# Patient Record
Sex: FEMALE | Race: BLACK OR AFRICAN AMERICAN | Employment: FULL TIME | URBAN - METROPOLITAN AREA
[De-identification: names, ages, dates, MRNs, and addresses within clinical notes are randomized per-mention and may not be internally consistent; named-entity substitution may affect disease eponyms.]

---

## 2020-06-25 ENCOUNTER — APPOINTMENT (OUTPATIENT)
Dept: GENERAL RADIOLOGY | Age: 60
End: 2020-06-25
Payer: COMMERCIAL

## 2020-06-25 ENCOUNTER — HOSPITAL ENCOUNTER (EMERGENCY)
Age: 60
Discharge: HOME | End: 2020-06-25
Payer: COMMERCIAL

## 2020-06-25 VITALS
SYSTOLIC BLOOD PRESSURE: 152 MMHG | HEIGHT: 66 IN | DIASTOLIC BLOOD PRESSURE: 89 MMHG | RESPIRATION RATE: 18 BRPM | OXYGEN SATURATION: 99 % | TEMPERATURE: 97.8 F | BODY MASS INDEX: 45 KG/M2 | WEIGHT: 280 LBS | HEART RATE: 77 BPM

## 2020-06-25 PROCEDURE — 6370000000 HC RX 637 (ALT 250 FOR IP): Performed by: PHYSICIAN ASSISTANT

## 2020-06-25 PROCEDURE — 73564 X-RAY EXAM KNEE 4 OR MORE: CPT

## 2020-06-25 PROCEDURE — 73502 X-RAY EXAM HIP UNI 2-3 VIEWS: CPT

## 2020-06-25 PROCEDURE — 99283 EMERGENCY DEPT VISIT LOW MDM: CPT

## 2020-06-25 RX ORDER — LIDOCAINE 4 G/G
1 PATCH TOPICAL ONCE
Status: DISCONTINUED | OUTPATIENT
Start: 2020-06-25 | End: 2020-06-25 | Stop reason: HOSPADM

## 2020-06-25 RX ORDER — CELECOXIB 200 MG/1
200 CAPSULE ORAL 2 TIMES DAILY
COMMUNITY
Start: 2020-05-29

## 2020-06-25 RX ORDER — OXYCODONE HYDROCHLORIDE AND ACETAMINOPHEN 5; 325 MG/1; MG/1
2 TABLET ORAL ONCE
Status: COMPLETED | OUTPATIENT
Start: 2020-06-25 | End: 2020-06-25

## 2020-06-25 RX ORDER — HYDROCHLOROTHIAZIDE 12.5 MG/1
1 TABLET ORAL DAILY
COMMUNITY
Start: 2018-05-25

## 2020-06-25 RX ORDER — ALBUTEROL SULFATE 90 UG/1
AEROSOL, METERED RESPIRATORY (INHALATION)
COMMUNITY
Start: 2020-03-19

## 2020-06-25 RX ORDER — LIDOCAINE 50 MG/G
1 PATCH TOPICAL DAILY
Qty: 30 PATCH | Refills: 0 | Status: SHIPPED | OUTPATIENT
Start: 2020-06-25 | End: 2022-08-25 | Stop reason: ALTCHOICE

## 2020-06-25 RX ORDER — AMLODIPINE BESYLATE 10 MG/1
10 TABLET ORAL DAILY
COMMUNITY

## 2020-06-25 RX ORDER — LOSARTAN POTASSIUM AND HYDROCHLOROTHIAZIDE 12.5; 5 MG/1; MG/1
TABLET ORAL
COMMUNITY
Start: 2007-05-23

## 2020-06-25 RX ORDER — ATORVASTATIN CALCIUM 40 MG/1
TABLET, FILM COATED ORAL
COMMUNITY

## 2020-06-25 RX ADMIN — OXYCODONE HYDROCHLORIDE AND ACETAMINOPHEN 2 TABLET: 5; 325 TABLET ORAL at 09:56

## 2020-06-25 SDOH — HEALTH STABILITY: MENTAL HEALTH: HOW OFTEN DO YOU HAVE A DRINK CONTAINING ALCOHOL?: NEVER

## 2020-06-25 ASSESSMENT — PAIN DESCRIPTION - ORIENTATION: ORIENTATION: LEFT

## 2020-06-25 ASSESSMENT — PAIN SCALES - GENERAL
PAINLEVEL_OUTOF10: 8
PAINLEVEL_OUTOF10: 8

## 2020-06-25 ASSESSMENT — PAIN DESCRIPTION - LOCATION: LOCATION: BACK;HIP;LEG;HEAD

## 2020-06-25 ASSESSMENT — PAIN DESCRIPTION - PAIN TYPE: TYPE: ACUTE PAIN

## 2020-06-25 NOTE — LETTER
[facility] Emergency Department  [address] [facility] [place], [address]             June 25, 2020    Patient: Tello Leger   YOB: 1960   Date of Visit: 6/25/2020       To Whom It May Concern:    Shai Suarez was seen and treated in our emergency department on 6/25/2020. She may return to work on date specified by [facility] - pt will follow up with them for further instructions and work restrictions. Sincerely,         MERRY SWEET.  [facility]

## 2020-06-25 NOTE — ED PROVIDER NOTES
rhonchi or rales. Abdominal:      General: Bowel sounds are normal. There is no distension. Palpations: Abdomen is soft. There is no mass. Tenderness: There is no abdominal tenderness. There is no guarding or rebound. Hernia: No hernia is present. Musculoskeletal: Normal range of motion. General: No swelling. Right lower leg: No edema. Left lower leg: No edema. Comments: Some generalized tenderness over the left hip and left knee. No obvious deformity. No midline tenderness or step-off in the neck or back. Mild decreased range of motion with flexion of the left hip and knee secondary to pain. Full range of motion of left ankle. Gross full range of motion. remainder 3 extremities. No obvious sign of deformity. Skin:     General: Skin is warm and dry. Findings: No erythema or rash. Neurological:      Mental Status: She is alert and oriented to person, place, and time. Cranial Nerves: No cranial nerve deficit. Psychiatric:         Behavior: Behavior normal.         DIAGNOSTIC RESULTS   LABS:    Labs Reviewed - No data to display    All other labs were within normal range or not returned as of this dictation. EKG: All EKG's are interpreted by the Emergency Department Physician in the absence of a cardiologist.  Please see their note for interpretation of EKG. RADIOLOGY:   Non-plain film images such as CT, Ultrasound and MRI are read by the radiologist. Plain radiographic images are visualized and preliminarily interpreted by the ED Provider with the below findings:        Interpretation per the Radiologist below, if available at the time of this note:    XR KNEE LEFT (MIN 4 VIEWS)   Preliminary Result   1. No acute radiographic abnormality of the left hip or osseous pelvis. There is mild symmetric bilateral hip osteoarthritis. 2. Small suprapatellar joint effusion at the left knee, without acute osseous   abnormality.   Moderate tricompartmental

## 2022-08-25 ENCOUNTER — APPOINTMENT (OUTPATIENT)
Dept: CT IMAGING | Age: 62
End: 2022-08-25
Payer: COMMERCIAL

## 2022-08-25 ENCOUNTER — HOSPITAL ENCOUNTER (EMERGENCY)
Age: 62
Discharge: HOME | End: 2022-08-25
Attending: EMERGENCY MEDICINE
Payer: COMMERCIAL

## 2022-08-25 VITALS
BODY MASS INDEX: 43.79 KG/M2 | DIASTOLIC BLOOD PRESSURE: 91 MMHG | WEIGHT: 279 LBS | HEIGHT: 67 IN | RESPIRATION RATE: 14 BRPM | HEART RATE: 67 BPM | TEMPERATURE: 97.9 F | OXYGEN SATURATION: 100 % | SYSTOLIC BLOOD PRESSURE: 168 MMHG

## 2022-08-25 DIAGNOSIS — M54.50 CHRONIC BILATERAL LOW BACK PAIN WITHOUT SCIATICA: Primary | ICD-10-CM

## 2022-08-25 DIAGNOSIS — I10 PRIMARY HYPERTENSION: ICD-10-CM

## 2022-08-25 DIAGNOSIS — G89.29 CHRONIC BILATERAL LOW BACK PAIN WITHOUT SCIATICA: Primary | ICD-10-CM

## 2022-08-25 LAB
BACTERIA: ABNORMAL /HPF
BILIRUBIN URINE: NEGATIVE
BLOOD, URINE: NEGATIVE
CLARITY: CLEAR
COLOR: YELLOW
EPITHELIAL CELLS, UA: ABNORMAL /HPF (ref 0–5)
GLUCOSE URINE: NEGATIVE MG/DL
KETONES, URINE: NEGATIVE MG/DL
LEUKOCYTE ESTERASE, URINE: ABNORMAL
MICROSCOPIC EXAMINATION: YES
NITRITE, URINE: NEGATIVE
PH UA: 7 (ref 5–8)
PROTEIN UA: NEGATIVE MG/DL
RBC UA: ABNORMAL /HPF (ref 0–4)
SPECIFIC GRAVITY UA: 1.01 (ref 1–1.03)
URINE TYPE: ABNORMAL
UROBILINOGEN, URINE: 0.2 E.U./DL
WBC UA: ABNORMAL /HPF (ref 0–5)

## 2022-08-25 PROCEDURE — 81001 URINALYSIS AUTO W/SCOPE: CPT

## 2022-08-25 PROCEDURE — 70450 CT HEAD/BRAIN W/O DYE: CPT

## 2022-08-25 PROCEDURE — 96372 THER/PROPH/DIAG INJ SC/IM: CPT

## 2022-08-25 PROCEDURE — 99284 EMERGENCY DEPT VISIT MOD MDM: CPT

## 2022-08-25 PROCEDURE — 6370000000 HC RX 637 (ALT 250 FOR IP): Performed by: EMERGENCY MEDICINE

## 2022-08-25 PROCEDURE — 6360000002 HC RX W HCPCS: Performed by: EMERGENCY MEDICINE

## 2022-08-25 RX ORDER — LIDOCAINE 50 MG/G
1 PATCH TOPICAL DAILY
Qty: 30 PATCH | Refills: 0 | Status: SHIPPED | OUTPATIENT
Start: 2022-08-25

## 2022-08-25 RX ORDER — CYCLOBENZAPRINE HCL 10 MG
10 TABLET ORAL 3 TIMES DAILY PRN
Qty: 15 TABLET | Refills: 0 | Status: SHIPPED | OUTPATIENT
Start: 2022-08-25

## 2022-08-25 RX ORDER — OXYCODONE AND ACETAMINOPHEN 10; 325 MG/1; MG/1
1 TABLET ORAL 4 TIMES DAILY
COMMUNITY
Start: 2022-07-31

## 2022-08-25 RX ORDER — LIDOCAINE 4 G/G
1 PATCH TOPICAL ONCE
Status: DISCONTINUED | OUTPATIENT
Start: 2022-08-25 | End: 2022-08-25 | Stop reason: HOSPADM

## 2022-08-25 RX ORDER — KETOROLAC TROMETHAMINE 30 MG/ML
15 INJECTION, SOLUTION INTRAMUSCULAR; INTRAVENOUS ONCE
Status: COMPLETED | OUTPATIENT
Start: 2022-08-25 | End: 2022-08-25

## 2022-08-25 RX ORDER — CYCLOBENZAPRINE HCL 10 MG
10 TABLET ORAL ONCE
Status: COMPLETED | OUTPATIENT
Start: 2022-08-25 | End: 2022-08-25

## 2022-08-25 RX ADMIN — CYCLOBENZAPRINE 10 MG: 10 TABLET, FILM COATED ORAL at 04:14

## 2022-08-25 RX ADMIN — KETOROLAC TROMETHAMINE 15 MG: 30 INJECTION, SOLUTION INTRAMUSCULAR at 04:14

## 2022-08-25 ASSESSMENT — PAIN - FUNCTIONAL ASSESSMENT
PAIN_FUNCTIONAL_ASSESSMENT: 0-10
PAIN_FUNCTIONAL_ASSESSMENT: ACTIVITIES ARE NOT PREVENTED
PAIN_FUNCTIONAL_ASSESSMENT: ACTIVITIES ARE NOT PREVENTED

## 2022-08-25 ASSESSMENT — PAIN DESCRIPTION - LOCATION
LOCATION: BACK;HEAD
LOCATION: BACK;HEAD

## 2022-08-25 ASSESSMENT — PAIN DESCRIPTION - DESCRIPTORS
DESCRIPTORS: PRESSURE;DISCOMFORT
DESCRIPTORS: ACHING;DISCOMFORT

## 2022-08-25 ASSESSMENT — PAIN DESCRIPTION - ONSET: ONSET: ON-GOING

## 2022-08-25 ASSESSMENT — PAIN DESCRIPTION - PAIN TYPE: TYPE: ACUTE PAIN

## 2022-08-25 ASSESSMENT — PAIN SCALES - GENERAL
PAINLEVEL_OUTOF10: 9
PAINLEVEL_OUTOF10: 9

## 2022-08-25 ASSESSMENT — ENCOUNTER SYMPTOMS
BACK PAIN: 1
EYES NEGATIVE: 1
RESPIRATORY NEGATIVE: 1
GASTROINTESTINAL NEGATIVE: 1

## 2022-08-25 ASSESSMENT — PAIN DESCRIPTION - FREQUENCY: FREQUENCY: CONTINUOUS

## 2022-08-25 ASSESSMENT — PAIN DESCRIPTION - ORIENTATION: ORIENTATION: MID

## 2022-08-25 NOTE — ED PROVIDER NOTES
[address] [facility] ENCOUNTER          ATTENDING PHYSICIAN NOTE       Date of evaluation: 8/25/2022    Chief Complaint     Hypertension, Headache, and Neck Pain      History of Present Illness     Jesus Frank is a 63 y.o. female who presents to the emergency department complaining of neck, back, knee pain and high blood pressure and headache. Patient has a history of chronic neck and back pain for which she follows with pain management specialist.  She states over the last several days the pain has been more severe than usual and not well controlled on her normal medications. She is also noted over the last day that her blood pressure has been elevated. She states has been as high as 919 systolic at home. She does note mild headache associate with this. She denies any fevers or chills. She denies any nausea or vomiting. She denies any chest pain or shortness of breath. Patient is anticoagulated but has no history of trauma. She denies any recent changes in her blood pressure medications or her pain management regimen. She states she was unable to sleep due to the pain which is what prompted her to come to the emergency department this morning. Review of Systems     Review of Systems   Constitutional: Negative. HENT: Negative. Eyes: Negative. Respiratory: Negative. Cardiovascular: Negative. Gastrointestinal: Negative. Genitourinary: Negative. Musculoskeletal:  Positive for arthralgias, back pain and myalgias. Neurological:  Positive for headaches. All other systems reviewed and are negative. Past Medical, Surgical, Family, and Social History     She has a past medical history of Cancer ([address] Hyperlipidemia, and Hypertension. She has no past surgical history on file. Her family history is not on file. She reports that she has never smoked.  She has never used smokeless tobacco. She reports that she does not drink alcohol and does not use drugs.    Medications     Previous Medications    ALBUTEROL SULFATE  (90 BASE) MCG/ACT INHALER    INHALE 2 PUFFS BY INHALATION ROUTE EVERY 4 TO 6 HOURS PRN FOR COUGH    AMLODIPINE (NORVASC) 10 MG TABLET    Take 10 mg by mouth daily    ATORVASTATIN (LIPITOR) 40 MG TABLET    Take by mouth    CELECOXIB (CELEBREX) 200 MG CAPSULE    Take 200 mg by mouth 2 times daily    HYDROCHLOROTHIAZIDE (HYDRODIURIL) 12.5 MG TABLET    Take 1 tablet by mouth daily    LOSARTAN-HYDROCHLOROTHIAZIDE (HYZAAR) 50-12.5 MG PER TABLET    Take by mouth    OXYCODONE-ACETAMINOPHEN (PERCOCET)  MG PER TABLET    Take 1 tablet by mouth 4 times daily. Allergies     She is allergic to amitriptyline and amlodipine. Physical Exam     INITIAL VITALS: BP: (!) 174/108, Temp: 97.9 °F (36.6 °C), Heart Rate: 69, Resp: 18, SpO2: 100 %   Physical Exam  Vitals and nursing note reviewed. Constitutional:       General: She is not in acute distress. HENT:      Head: Normocephalic and atraumatic. Mouth/Throat:      Mouth: Mucous membranes are moist.      Pharynx: No oropharyngeal exudate. Eyes:      General: No scleral icterus. Extraocular Movements: Extraocular movements intact. Conjunctiva/sclera: Conjunctivae normal.      Pupils: Pupils are equal, round, and reactive to light. Cardiovascular:      Rate and Rhythm: Normal rate and regular rhythm. Heart sounds: Normal heart sounds. Pulmonary:      Effort: Pulmonary effort is normal.      Breath sounds: Normal breath sounds. No wheezing, rhonchi or rales. Abdominal:      General: Bowel sounds are normal.      Palpations: Abdomen is soft. Tenderness: There is no abdominal tenderness. There is no guarding or rebound. Musculoskeletal:         General: Tenderness present. No swelling. Normal range of motion. Cervical back: Normal range of motion and neck supple. Thoracic back: Tenderness present. No bony tenderness.       Comments: Patient does have bandlike tenderness across the lower lumbar spine with no midline tenderness or step-offs noted. She does complain of pain to bilateral knees but states that this is chronic. There is no overlying erythema or warmth noted. Skin:     General: Skin is warm and dry. Neurological:      General: No focal deficit present. Mental Status: She is alert and oriented to person, place, and time. Cranial Nerves: No cranial nerve deficit. Motor: No weakness. Coordination: Coordination normal.       Diagnostic Results     RADIOLOGY:  CT HEAD WO CONTRAST   Final Result      No acute intracranial abnormality. LABS:   Results for orders placed or performed during the hospital encounter of 08/25/22   Urinalysis   Result Value Ref Range    Color, UA Yellow Straw/Yellow    Clarity, UA Clear Clear    Glucose, Ur Negative Negative mg/dL    Bilirubin Urine Negative Negative    Ketones, Urine Negative Negative mg/dL    Specific Gravity, UA 1.015 1.005 - 1.030    Blood, Urine Negative Negative    pH, UA 7.0 5.0 - 8.0    Protein, UA Negative Negative mg/dL    Urobilinogen, Urine 0.2 <2.0 E.U./dL    Nitrite, Urine Negative Negative    Leukocyte Esterase, Urine MODERATE (A) Negative    Microscopic Examination YES     Urine Type NotGiven    Microscopic Urinalysis   Result Value Ref Range    WBC, UA 3-5 0 - 5 /HPF    RBC, UA 0-2 0 - 4 /HPF    Epithelial Cells, UA 6-10 (A) 0 - 5 /HPF    Bacteria, UA 1+ (A) None Seen /HPF       ED BEDSIDE ULTRASOUND:  No results found. RECENT VITALS:  BP: (!) 168/91,Temp: 97.9 °F (36.6 °C), Heart Rate: 67, Resp: 14, SpO2: 100 %     Procedures     N/A    ED Course     Nursing Notes, Past Medical Hx, Past Surgical Hx, Social Hx,Allergies, and Family Hx were reviewed.          patient was given the following medications:  Orders Placed This Encounter   Medications    ketorolac (TORADOL) injection 15 mg    cyclobenzaprine (FLEXERIL) tablet 10 mg    lidocaine 4 % external patch 1 patch    lidocaine (LIDODERM) 5 %     Sig: Place 1 patch onto the skin daily 12 hours on, 12 hours off. Dispense:  30 patch     Refill:  0       CONSULTS:  None    MEDICAL DECISIONMAKING / ASSESSMENT / Helen Bosworth is a 63 y.o. female with history of hypertension chronic back pain presents complaining of both. Patient states that she has been having worsening back pain over the last several days and is noted that her blood pressure has been elevated. She also complains of headache that she states is typical for her blood pressure being elevated, but the back pain is not typical for her blood pressure. On arrival, patient is hypertensive in the 170s over 110s. She has no focal neurologic deficits on exam.  Urinalysis shows no evidence of infection. CT scan of the head shows no acute intracranial abnormalities. Patient was given Toradol, Flexeril, and Lidoderm patch with improvement of her pain. With her pain improving, blood pressure did start to trend down. I feel most likely patient's blood pressure elevation is due to her discomfort. I did recommend that she contact her pain management specialist for titration of medications and see if this improves her blood pressure. She will otherwise be instructed to follow-up with her primary care provider for recheck of her blood pressure and further titration of her antihypertensives. Clinical Impression     1. Chronic bilateral low back pain without sciatica    2. Primary hypertension        Disposition     PATIENT REFERRED TO:  Mariaelena Pedraza  [address] [address] [phone]          Bala Fraser (Will) [address] [place] [id number]  [address]  [phone]          DISCHARGE MEDICATIONS:  New Prescriptions    LIDOCAINE (LIDODERM) 5 %    Place 1 patch onto the skin daily 12 hours on, 12 hours off.        DISPOSITION Decision To Discharge 08/25/2022 06:13:32 AM         Oliver Huang MD  08/25/22 0745

## 2022-08-25 NOTE — LETTER
[facility] Emergency Department  [facility] [address]  Phone: [phone]  Fax: [phone]               August 25, 2022    Patient: Robin Ramirez   YOB: 1960   Date of Visit: 8/25/2022       To Whom It May Concern:    Nicole Trent was seen and treated in our emergency department on 8/25/2022. She may return to work on 8/26/22.       Sincerely,       Zach Bergeron MD         Signature:__________________________________

## 2022-08-25 NOTE — ED NOTES
Pt discharged from ED in stable condition. Discharge instruction explain, all question answered. Prescription given. Pt walked to [facility] independently.        Michelet Simon RN  08/25/22 [id number]

## 2022-08-25 NOTE — DISCHARGE INSTRUCTIONS
Elevated blood pressure is likely due to discomfort from worsening back pain. Continue your current medications. Use Lidoderm patches 12 hours on at a time and 12 hours off. Use heat over sore areas. Follow-up with your pain management specialist and primary care provider for repeat evaluation and further symptom management.

## 2022-08-25 NOTE — ED TRIAGE NOTES
Patient arrived in the ER with c/o high b/p, neck pain, headache and back pain. Patient states that her b/p has been going up and down all day. Patient has a hx of hypertension.

## 2023-08-31 NOTE — ED NOTES
Nursing Discharge Notes:    -Patient discharged at this time in no acute distress after verbalizing understanding of discharge instructions and need for follow up with PCP and/or specialist if one was referred.  -A copy of the AVS was reviewed with pt and/or family.  -Pt received applicable scripts which were reviewed with pt and/or family by this RN. -Pt was given the opportunity to ask questions before signing for discharge. Pt aware to follow up with [facility]. Patient Mobility at Discharge:    -Pt left ambulatory to lobby / discharge area. -Wheelchair offered and was declined. Patient Education:    Learner - Patient and/or family / caregiver. Motivation and Readiness To Learn - Medium to High  Barriers To Learning - None  Learning Preference / Provided Instructions - Both written and verbal discharge instructions.      Louie Doan RN  06/25/20 2226 Type Of Destruction Used: Curettage

## 2024-06-26 ENCOUNTER — HOSPITAL ENCOUNTER (EMERGENCY)
Age: 64
Discharge: HOME | End: 2024-06-27
Attending: EMERGENCY MEDICINE
Payer: COMMERCIAL

## 2024-06-26 ENCOUNTER — APPOINTMENT (OUTPATIENT)
Dept: GENERAL RADIOLOGY | Age: 64
End: 2024-06-26
Payer: COMMERCIAL

## 2024-06-26 DIAGNOSIS — R42 LIGHTHEADEDNESS: Primary | ICD-10-CM

## 2024-06-26 LAB
ANION GAP SERPL CALCULATED.3IONS-SCNC: 10 MMOL/L (ref 3–16)
ANTI-XA UNFRAC HEPARIN: <0.1 IU/ML (ref 0.3–0.7)
BASOPHILS # BLD: 0 K/UL (ref 0–0.2)
BASOPHILS NFR BLD: 0.2 %
BUN SERPL-MCNC: 16 MG/DL (ref 7–20)
CALCIUM SERPL-MCNC: 9.1 MG/DL (ref 8.3–10.6)
CHLORIDE SERPL-SCNC: 102 MMOL/L (ref 99–110)
CO2 SERPL-SCNC: 26 MMOL/L (ref 21–32)
CREAT SERPL-MCNC: 0.8 MG/DL (ref 0.6–1.2)
DEPRECATED RDW RBC AUTO: 15.2 % (ref 12.4–15.4)
EOSINOPHIL # BLD: 0.1 K/UL (ref 0–0.6)
EOSINOPHIL NFR BLD: 0.8 %
GFR SERPLBLD CREATININE-BSD FMLA CKD-EPI: 84 ML/MIN/{1.73_M2}
GLUCOSE SERPL-MCNC: 131 MG/DL (ref 70–99)
HCT VFR BLD AUTO: 38.8 % (ref 36–48)
HGB BLD-MCNC: 12.5 G/DL (ref 12–16)
LYMPHOCYTES # BLD: 1.1 K/UL (ref 1–5.1)
LYMPHOCYTES NFR BLD: 15.1 %
MAGNESIUM SERPL-MCNC: 1.9 MG/DL (ref 1.8–2.4)
MCH RBC QN AUTO: 29.6 PG (ref 26–34)
MCHC RBC AUTO-ENTMCNC: 32.1 G/DL (ref 31–36)
MCV RBC AUTO: 92 FL (ref 80–100)
MONOCYTES # BLD: 0.5 K/UL (ref 0–1.3)
MONOCYTES NFR BLD: 6.4 %
NEUTROPHILS # BLD: 5.5 K/UL (ref 1.7–7.7)
NEUTROPHILS NFR BLD: 77.5 %
NT-PROBNP SERPL-MCNC: 110 PG/ML (ref 0–124)
PLATELET # BLD AUTO: 147 K/UL (ref 135–450)
PMV BLD AUTO: 7.5 FL (ref 5–10.5)
POTASSIUM SERPL-SCNC: 4.6 MMOL/L (ref 3.5–5.1)
RBC # BLD AUTO: 4.22 M/UL (ref 4–5.2)
SODIUM SERPL-SCNC: 138 MMOL/L (ref 136–145)
TROPONIN, HIGH SENSITIVITY: <6 NG/L (ref 0–14)
WBC # BLD AUTO: 7.1 K/UL (ref 4–11)

## 2024-06-26 PROCEDURE — 85025 COMPLETE CBC W/AUTO DIFF WBC: CPT

## 2024-06-26 PROCEDURE — 83880 ASSAY OF NATRIURETIC PEPTIDE: CPT

## 2024-06-26 PROCEDURE — 71045 X-RAY EXAM CHEST 1 VIEW: CPT

## 2024-06-26 PROCEDURE — 6370000000 HC RX 637 (ALT 250 FOR IP)

## 2024-06-26 PROCEDURE — 83735 ASSAY OF MAGNESIUM: CPT

## 2024-06-26 PROCEDURE — 99285 EMERGENCY DEPT VISIT HI MDM: CPT

## 2024-06-26 PROCEDURE — 93005 ELECTROCARDIOGRAM TRACING: CPT | Performed by: EMERGENCY MEDICINE

## 2024-06-26 PROCEDURE — 80048 BASIC METABOLIC PNL TOTAL CA: CPT

## 2024-06-26 PROCEDURE — 84484 ASSAY OF TROPONIN QUANT: CPT

## 2024-06-26 PROCEDURE — 85520 HEPARIN ASSAY: CPT

## 2024-06-26 RX ORDER — METHOCARBAMOL 500 MG/1
1000 TABLET, FILM COATED ORAL ONCE
Status: COMPLETED | OUTPATIENT
Start: 2024-06-26 | End: 2024-06-26

## 2024-06-26 RX ORDER — ACETAMINOPHEN 500 MG
1000 TABLET ORAL ONCE
Status: COMPLETED | OUTPATIENT
Start: 2024-06-26 | End: 2024-06-26

## 2024-06-26 RX ORDER — OXYCODONE HYDROCHLORIDE 5 MG/1
10 TABLET ORAL ONCE
Status: COMPLETED | OUTPATIENT
Start: 2024-06-26 | End: 2024-06-26

## 2024-06-26 RX ADMIN — ACETAMINOPHEN 1000 MG: 500 TABLET ORAL at 23:34

## 2024-06-26 RX ADMIN — METHOCARBAMOL 1000 MG: 500 TABLET ORAL at 23:34

## 2024-06-26 RX ADMIN — OXYCODONE HYDROCHLORIDE 10 MG: 5 TABLET ORAL at 23:34

## 2024-06-26 ASSESSMENT — PAIN DESCRIPTION - ORIENTATION
ORIENTATION: MID
ORIENTATION: LOWER

## 2024-06-26 ASSESSMENT — PAIN DESCRIPTION - FREQUENCY: FREQUENCY: INTERMITTENT

## 2024-06-26 ASSESSMENT — PAIN SCALES - GENERAL
PAINLEVEL_OUTOF10: 10
PAINLEVEL_OUTOF10: 8

## 2024-06-26 ASSESSMENT — PAIN DESCRIPTION - DESCRIPTORS: DESCRIPTORS: DISCOMFORT

## 2024-06-26 ASSESSMENT — PAIN DESCRIPTION - LOCATION
LOCATION: BACK
LOCATION: BACK

## 2024-06-26 ASSESSMENT — LIFESTYLE VARIABLES
HOW MANY STANDARD DRINKS CONTAINING ALCOHOL DO YOU HAVE ON A TYPICAL DAY: 1 OR 2
HOW OFTEN DO YOU HAVE A DRINK CONTAINING ALCOHOL: MONTHLY OR LESS

## 2024-06-26 ASSESSMENT — PAIN - FUNCTIONAL ASSESSMENT
PAIN_FUNCTIONAL_ASSESSMENT: 0-10
PAIN_FUNCTIONAL_ASSESSMENT: PREVENTS OR INTERFERES SOME ACTIVE ACTIVITIES AND ADLS

## 2024-06-26 ASSESSMENT — PAIN DESCRIPTION - PAIN TYPE: TYPE: CHRONIC PAIN

## 2024-06-26 ASSESSMENT — PAIN DESCRIPTION - ONSET: ONSET: ON-GOING

## 2024-06-27 ENCOUNTER — APPOINTMENT (OUTPATIENT)
Dept: CT IMAGING | Age: 64
End: 2024-06-27
Payer: COMMERCIAL

## 2024-06-27 VITALS
SYSTOLIC BLOOD PRESSURE: 126 MMHG | OXYGEN SATURATION: 99 % | TEMPERATURE: 98.3 F | RESPIRATION RATE: 12 BRPM | HEIGHT: 66 IN | WEIGHT: 275 LBS | DIASTOLIC BLOOD PRESSURE: 78 MMHG | HEART RATE: 67 BPM | BODY MASS INDEX: 44.2 KG/M2

## 2024-06-27 LAB
EKG ATRIAL RATE: 63 BPM
EKG DIAGNOSIS: NORMAL
EKG P AXIS: 58 DEGREES
EKG P-R INTERVAL: 176 MS
EKG Q-T INTERVAL: 422 MS
EKG QRS DURATION: 88 MS
EKG QTC CALCULATION (BAZETT): 431 MS
EKG R AXIS: 32 DEGREES
EKG T AXIS: -4 DEGREES
EKG VENTRICULAR RATE: 63 BPM

## 2024-06-27 PROCEDURE — 6360000004 HC RX CONTRAST MEDICATION

## 2024-06-27 PROCEDURE — 71260 CT THORAX DX C+: CPT

## 2024-06-27 RX ADMIN — IOPAMIDOL 75 ML: 755 INJECTION, SOLUTION INTRAVENOUS at 01:05

## 2024-06-27 ASSESSMENT — PAIN SCALES - GENERAL: PAINLEVEL_OUTOF10: 6

## 2024-06-27 NOTE — ED NOTES
Pt sitting up in bed eyes open, AAOx4, NAD, resp e/u on RA, bed locked lowest position, rails up x2, call light within reach, family at bedside, pt on BP cuff Pulse ox and Cardiac monitor, pt states has been having Mid back pain and neck pain the past 5 days which she states typically she deals with low back pain from a previous injury and sees pain management for, but that this pain has been worsening, and today while driving home from [employer] study started feeling dizzy which resolved by the time she arrived to the ED, requesting pain medication, will request from provider. Pt has 20g IV noted to R AC but is infiltrated when going to flush it, removed by this RN.     Filerman, Brandon, RN  06/26/24 1900

## 2024-06-27 NOTE — ED PROVIDER NOTES
ED Attending Attestation Note     Date of evaluation: 6/26/2024    This patient was seen by the resident.  I have seen and examined the patient, agree with the workup, evaluation, management and diagnosis. The care plan has been discussed.  I have reviewed the ECG and concur with the resident's interpretation.  My assessment reveals patient is awake and alert, no acute distress.  No increased work of breathing.  No asymmetric lower extremity swelling.  Face symmetric, speech clear.  Strong regular radial pulse.     Brendan Diallo MD  06/27/24 0223

## 2024-06-27 NOTE — DISCHARGE INSTRUCTIONS
You were seen in the emergency department for an episode of lightheadedness.  Your laboratory and imaging were normal.  A small 1.5 cm nodule was seen in the right lobe of your thyroid - we recommend following up with your primary care provider for additional workup and monitoring of the nodule.    Please return to the emergency department if you lose consciousness, develop chest pain, shortness of breath, severe headache or vision changes.

## 2024-06-27 NOTE — ED PROVIDER NOTES
[facility]  EMERGENCY DEPARTMENT ENCOUNTER          EM RESIDENT NOTE     Date of evaluation: 6/26/2024    Chief Complaint     Dizziness (Pt came to the ED with c/o dizziness and lower back pain)    History of Present Illness     Jackelyn Monsivais is a 63 y.o. femalewith a history of HTN, HLD breast cancer, being on Eliquis who presents lightheadedness.  States she was driving around 7 PM this evening when she developed acute onset lightheadedness and felt like she might pass out.  She was able to pull over and was transported by family to the hospital.  Denies associated shortness of breath, chest pain, palpitations.  States she had a drink of water and is now feeling better.  Reports intermittent adherence to Eliquis therapy - states it has been multiple days since her last dose.    MEDICAL DECISION MAKING / ASSESSMENT / PLAN     INITIAL VITALS: BP: (!) 166/88, Temp: 98.3 °F (36.8 °C), Pulse: 65, Respirations: 23, SpO2: 100 %    Jackelyn Monsivais is a 63 y.o. female with a history of pulmonary embolism with intermittent compliance with anticoagulation therapy who presents for evaluation of presyncope. Appeared comfortable, conversational, and was in no acute distress.  Nonfocal cardiopulmonary exam.  Vital signs notable for mild hypertension.  No tachycardia.  Of note, patient was intermittently hypoxic during conversation but would probably return to the 90s without intervention.  Denied subjective shortness of breath.    Patient’s presentation is most consistent with presyncope. EKG without evidence of arrhythmia or ischemic changes.  Initial high-sensitivity troponin negative.  BNP within normal limits.  No evidence of renal dysfunction or electrolyte abnormalities.  Patient does appear to be subtherapeutic on her Eliquis therapy.  A CTPA was ordered and showed no evidence of a pulmonary embolism.  Imaging did reveal an incidental finding of a 1.5 cm thyroid nodule, which was communicated to the patient.